# Patient Record
Sex: FEMALE | Race: WHITE | Employment: FULL TIME | URBAN - METROPOLITAN AREA
[De-identification: names, ages, dates, MRNs, and addresses within clinical notes are randomized per-mention and may not be internally consistent; named-entity substitution may affect disease eponyms.]

---

## 2017-06-27 RX ORDER — SERTRALINE HYDROCHLORIDE 25 MG/1
25 TABLET, FILM COATED ORAL
Qty: 30 TABLET | Refills: 0 | Status: SHIPPED | OUTPATIENT
Start: 2017-06-27 | End: 2017-07-12

## 2017-06-29 ENCOUNTER — OFFICE VISIT (OUTPATIENT)
Dept: OBGYN CLINIC | Facility: CLINIC | Age: 52
End: 2017-06-29

## 2017-06-29 VITALS
HEIGHT: 66.5 IN | WEIGHT: 165 LBS | SYSTOLIC BLOOD PRESSURE: 112 MMHG | BODY MASS INDEX: 26.2 KG/M2 | DIASTOLIC BLOOD PRESSURE: 64 MMHG

## 2017-06-29 DIAGNOSIS — Z01.419 WELL FEMALE EXAM WITH ROUTINE GYNECOLOGICAL EXAM: Primary | ICD-10-CM

## 2017-06-29 DIAGNOSIS — Z12.4 SCREENING FOR MALIGNANT NEOPLASM OF CERVIX: ICD-10-CM

## 2017-06-29 PROBLEM — R10.2 LEFT ADNEXAL TENDERNESS: Status: ACTIVE | Noted: 2017-06-29

## 2017-06-29 PROCEDURE — 99396 PREV VISIT EST AGE 40-64: CPT | Performed by: OBSTETRICS & GYNECOLOGY

## 2017-06-29 NOTE — PROGRESS NOTES
Annual  Irregular menses, skipped last month  Hot flash discussed  Family is well, oldest graduated, has job! ROS: No Cardiac, Respiratory, GI,  or Neurological symptoms.     PE:  GENERAL: well developed, well nourished, in no apparent distress  SKIN:

## 2017-07-10 ENCOUNTER — APPOINTMENT (OUTPATIENT)
Dept: OBGYN CLINIC | Facility: CLINIC | Age: 52
End: 2017-07-10

## 2017-07-10 ENCOUNTER — OFFICE VISIT (OUTPATIENT)
Dept: OBGYN CLINIC | Facility: CLINIC | Age: 52
End: 2017-07-10

## 2017-07-10 DIAGNOSIS — N92.6 IRREGULAR MENSES: Primary | ICD-10-CM

## 2017-07-10 PROCEDURE — 99213 OFFICE O/P EST LOW 20 MIN: CPT | Performed by: OBSTETRICS & GYNECOLOGY

## 2017-07-10 PROCEDURE — 76830 TRANSVAGINAL US NON-OB: CPT | Performed by: OBSTETRICS & GYNECOLOGY

## 2017-07-10 NOTE — PROGRESS NOTES
GYN H&P     7/10/2017  3:26 PM    CC: Patient presents with: Other: u/s review      HPI: patient is a 54year old [id number] here for Patient presents with: Other: u/s review      Pt is here to discuss US results.    Pt reports heavy menstrual bleeding, amberly Onset   • Cancer Father      KIDNEY,COLON   • Diabetes Father    • Hypertension Father    • Lipids Father    • Cancer Paternal Grandfather      COLON       Social History  Social History   Marital status:   Spouse name: N/A    Years of education: N/

## 2017-07-11 ENCOUNTER — PATIENT MESSAGE (OUTPATIENT)
Dept: OBGYN CLINIC | Facility: CLINIC | Age: 52
End: 2017-07-11

## 2017-07-11 DIAGNOSIS — Z01.419 WELL WOMAN EXAM WITH ROUTINE GYNECOLOGICAL EXAM: ICD-10-CM

## 2017-07-11 DIAGNOSIS — N92.6 IRREGULAR MENSES: Primary | ICD-10-CM

## 2017-07-11 NOTE — TELEPHONE ENCOUNTER
From: Nupur Medina  To: Greg Damico MD  Sent: 7/11/2017 10:38 AM CDT  Subject: Test Results Question    Hi Dr. Brian Grayson. ..ultra sound yesterday showed nothing on my left ovary but did show a thickening of the uterus (quite thick) and Dr. Moris Paris suggested

## 2017-07-12 RX ORDER — SERTRALINE HYDROCHLORIDE 25 MG/1
25 TABLET, FILM COATED ORAL
Qty: 30 TABLET | Refills: 10 | Status: SHIPPED | OUTPATIENT
Start: 2017-07-12 | End: 2018-06-04

## 2018-06-04 ENCOUNTER — TELEPHONE (OUTPATIENT)
Dept: OBGYN CLINIC | Facility: CLINIC | Age: 53
End: 2018-06-04

## 2018-06-04 RX ORDER — SERTRALINE HYDROCHLORIDE 25 MG/1
25 TABLET, FILM COATED ORAL
Qty: 30 TABLET | Refills: 1 | Status: SHIPPED | OUTPATIENT
Start: 2018-06-04 | End: 2018-07-26

## 2018-06-04 NOTE — TELEPHONE ENCOUNTER
Last OV: 06/29/17  Last refill date: 07/12/17  Follow-up: RTC 1 year or PRN  Next appt.: Please advise if OK for refill of Setraline until appt on 07/26/18

## 2018-06-04 NOTE — TELEPHONE ENCOUNTER
Patient is calling to get a refill of Sertraline.  She has scheduled her annual Physical w/Dr. Brian Grayson on 7-

## 2018-07-26 ENCOUNTER — OFFICE VISIT (OUTPATIENT)
Dept: OBGYN CLINIC | Facility: CLINIC | Age: 53
End: 2018-07-26
Payer: COMMERCIAL

## 2018-07-26 VITALS
WEIGHT: 177 LBS | HEART RATE: 80 BPM | DIASTOLIC BLOOD PRESSURE: 80 MMHG | SYSTOLIC BLOOD PRESSURE: 130 MMHG | BODY MASS INDEX: 28.11 KG/M2 | HEIGHT: 66.5 IN

## 2018-07-26 DIAGNOSIS — Z01.419 WELL WOMAN EXAM WITH ROUTINE GYNECOLOGICAL EXAM: Primary | ICD-10-CM

## 2018-07-26 PROCEDURE — 99396 PREV VISIT EST AGE 40-64: CPT | Performed by: OBSTETRICS & GYNECOLOGY

## 2018-07-26 RX ORDER — SERTRALINE HYDROCHLORIDE 25 MG/1
25 TABLET, FILM COATED ORAL
Qty: 30 TABLET | Refills: 11 | Status: SHIPPED | OUTPATIENT
Start: 2018-07-26 | End: 2019-07-21

## 2018-07-26 NOTE — PROGRESS NOTES
Annual  No C/O  No menses for four months, discussed menopause and symptoms (last year also)  No mammogram  [place] [facility]    ROS: No Cardiac, Respiratory, GI,  or Neurological symptoms.     PE:  GENERAL: well developed, well nourished, in no

## 2019-07-22 NOTE — TELEPHONE ENCOUNTER
Last OV: 7/26/18 with Dr. Real Landry for annual  Last refill date: 7/26/18  Follow-up: 1 year  Next appt.: 8/6/19    Routed to provider for approval.

## 2019-07-23 RX ORDER — SERTRALINE HYDROCHLORIDE 25 MG/1
TABLET, FILM COATED ORAL
Qty: 30 TABLET | Refills: 10 | Status: SHIPPED | OUTPATIENT
Start: 2019-07-23 | End: 2019-08-06

## 2019-08-06 ENCOUNTER — OFFICE VISIT (OUTPATIENT)
Dept: OBGYN CLINIC | Facility: CLINIC | Age: 54
End: 2019-08-06
Payer: COMMERCIAL

## 2019-08-06 VITALS
DIASTOLIC BLOOD PRESSURE: 74 MMHG | HEIGHT: 66.5 IN | SYSTOLIC BLOOD PRESSURE: 132 MMHG | WEIGHT: 175 LBS | BODY MASS INDEX: 27.79 KG/M2

## 2019-08-06 DIAGNOSIS — Z12.31 VISIT FOR SCREENING MAMMOGRAM: Primary | ICD-10-CM

## 2019-08-06 PROCEDURE — 99396 PREV VISIT EST AGE 40-64: CPT | Performed by: OBSTETRICS & GYNECOLOGY

## 2019-08-06 RX ORDER — SERTRALINE HYDROCHLORIDE 25 MG/1
25 TABLET, FILM COATED ORAL
Qty: 90 TABLET | Refills: 3 | Status: SHIPPED | OUTPATIENT
Start: 2019-08-06 | End: 2020-11-17

## 2019-08-06 NOTE — PROGRESS NOTES
Annual  No C/O  Menses 7 months ago  Menopausal symptoms tolerable  Youngest in high school    ROS: No Cardiac, Respiratory, GI,  or Neurological symptoms.     PE:  GENERAL: well developed, well nourished, in no apparent distress alert oriented x 3  SKIN:

## 2020-11-13 NOTE — TELEPHONE ENCOUNTER
Last OV: 8/6/19 with Dr. Ada Hodge for annual  Last refill date: 8/6/19  Follow-up: 1 year  Next appt.: none scheduled; due 8/2020    Patient over due for annual. Please contact her to schedule appt and then return to RN pool for refill.  Will consult with MD torre

## 2020-11-14 NOTE — TELEPHONE ENCOUNTER
Patient called back and made her annual for Feb 16, 2021 with Dr. Kavya Hebert. Please refill her script. Patient is also looking to get her mammogram done too, if possible.

## 2020-11-17 RX ORDER — SERTRALINE HYDROCHLORIDE 25 MG/1
25 TABLET, FILM COATED ORAL
Qty: 90 TABLET | Refills: 0 | Status: SHIPPED | OUTPATIENT
Start: 2020-11-17 | End: 2021-03-01

## 2021-02-16 ENCOUNTER — OFFICE VISIT (OUTPATIENT)
Dept: OBGYN CLINIC | Facility: CLINIC | Age: 56
End: 2021-02-16
Payer: COMMERCIAL

## 2021-02-16 VITALS
HEART RATE: 74 BPM | WEIGHT: 186.38 LBS | SYSTOLIC BLOOD PRESSURE: 122 MMHG | BODY MASS INDEX: 29.6 KG/M2 | HEIGHT: 66.5 IN | DIASTOLIC BLOOD PRESSURE: 76 MMHG

## 2021-02-16 DIAGNOSIS — Z12.31 VISIT FOR SCREENING MAMMOGRAM: ICD-10-CM

## 2021-02-16 DIAGNOSIS — Z01.419 WELL FEMALE EXAM WITH ROUTINE GYNECOLOGICAL EXAM: Primary | ICD-10-CM

## 2021-02-16 DIAGNOSIS — Z12.4 SCREENING FOR MALIGNANT NEOPLASM OF CERVIX: ICD-10-CM

## 2021-02-16 PROCEDURE — 3008F BODY MASS INDEX DOCD: CPT | Performed by: OBSTETRICS & GYNECOLOGY

## 2021-02-16 PROCEDURE — 99396 PREV VISIT EST AGE 40-64: CPT | Performed by: OBSTETRICS & GYNECOLOGY

## 2021-02-16 PROCEDURE — 88175 CYTOPATH C/V AUTO FLUID REDO: CPT | Performed by: OBSTETRICS & GYNECOLOGY

## 2021-02-16 PROCEDURE — 3078F DIAST BP <80 MM HG: CPT | Performed by: OBSTETRICS & GYNECOLOGY

## 2021-02-16 PROCEDURE — 3074F SYST BP LT 130 MM HG: CPT | Performed by: OBSTETRICS & GYNECOLOGY

## 2021-02-16 RX ORDER — MULTIVIT-MIN/IRON/FOLIC ACID/K 18-600-40
CAPSULE ORAL
COMMUNITY

## 2021-02-16 RX ORDER — CHLORAL HYDRATE 500 MG
1000 CAPSULE ORAL DAILY
COMMUNITY

## 2021-02-16 NOTE — PROGRESS NOTES
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     PE:  GENERAL: well developed, well nourished

## 2021-03-01 RX ORDER — SERTRALINE HYDROCHLORIDE 25 MG/1
TABLET, FILM COATED ORAL
Qty: 90 TABLET | Refills: 3 | Status: SHIPPED | OUTPATIENT
Start: 2021-03-01 | End: 2021-12-13

## 2021-03-01 NOTE — TELEPHONE ENCOUNTER
Last OV: 2/16/21 with Dr. Daniel Goodwin for annual  Last refill date: 11/17/20  Follow-up: 1 year  Next appt. : 2/18/22    Refill sent

## 2021-03-15 ENCOUNTER — HOSPITAL ENCOUNTER (OUTPATIENT)
Dept: MAMMOGRAPHY | Facility: HOSPITAL | Age: 56
Discharge: HOME | End: 2021-03-15
Attending: OBSTETRICS & GYNECOLOGY
Payer: COMMERCIAL

## 2021-03-15 DIAGNOSIS — Z12.31 VISIT FOR SCREENING MAMMOGRAM: ICD-10-CM

## 2021-03-15 PROCEDURE — 77063 BREAST TOMOSYNTHESIS BI: CPT | Performed by: OBSTETRICS & GYNECOLOGY

## 2021-03-15 PROCEDURE — 77067 SCR MAMMO BI INCL CAD: CPT | Performed by: OBSTETRICS & GYNECOLOGY

## 2022-02-10 RX ORDER — SERTRALINE HYDROCHLORIDE 25 MG/1
TABLET, FILM COATED ORAL
Qty: 90 TABLET | Refills: 0 | OUTPATIENT
Start: 2022-02-10

## 2022-02-10 NOTE — TELEPHONE ENCOUNTER
Last OV: 2/16/21 with Dr. Radha Coto for annual  Last refill date: 2/2/22  Follow-up: 1 year  Next appt. : 2/22/22    Refill not appropriate

## 2022-02-22 ENCOUNTER — OFFICE VISIT (OUTPATIENT)
Dept: OBGYN CLINIC | Facility: CLINIC | Age: 57
End: 2022-02-22
Payer: COMMERCIAL

## 2022-02-22 VITALS
HEIGHT: 67.25 IN | SYSTOLIC BLOOD PRESSURE: 122 MMHG | BODY MASS INDEX: 29.47 KG/M2 | HEART RATE: 70 BPM | DIASTOLIC BLOOD PRESSURE: 76 MMHG | WEIGHT: 190 LBS

## 2022-02-22 DIAGNOSIS — Z01.419 WELL FEMALE EXAM WITH ROUTINE GYNECOLOGICAL EXAM: Primary | ICD-10-CM

## 2022-02-22 DIAGNOSIS — Z12.31 VISIT FOR SCREENING MAMMOGRAM: ICD-10-CM

## 2022-02-22 PROCEDURE — 99396 PREV VISIT EST AGE 40-64: CPT | Performed by: OBSTETRICS & GYNECOLOGY

## 2022-02-22 PROCEDURE — 3008F BODY MASS INDEX DOCD: CPT | Performed by: OBSTETRICS & GYNECOLOGY

## 2022-02-22 PROCEDURE — 3074F SYST BP LT 130 MM HG: CPT | Performed by: OBSTETRICS & GYNECOLOGY

## 2022-02-22 PROCEDURE — 3078F DIAST BP <80 MM HG: CPT | Performed by: OBSTETRICS & GYNECOLOGY

## 2022-02-22 RX ORDER — SERTRALINE HYDROCHLORIDE 25 MG/1
25 TABLET, FILM COATED ORAL DAILY
COMMUNITY
Start: 2022-01-13

## 2022-02-22 NOTE — PROGRESS NOTES
Annual  No C/O  Nothing new  Shelby Jon got  in October    ROS: No Cardiac, Respiratory, GI,  or Neurological symptoms.     PE:  GENERAL: well developed, well nourished, in no apparent distress alert oriented x 3  SKIN: no rashes, no suspicious lesions  NECK: supple; no thyroidmegaly, no adenopathy  LUNGS: clear  COR: regular  BREAST: firm nontender, no palpable masses or LN's, no nipple discharge, no skin retraction, no axillary adenopathy,   ABDOMEN: Soft, non distended; non tender, no masses  GYNE/: External Genitalia: Normal      Vagina: normal      Uterus: anterior, mobile, non tender, Normal size     Cervix: NL, no lesions     Adnexa: non tender, no masses,   EXTREMITIES:  non tender without edema or DVT  LYMPH NODES:negative    A/P: Normal exam  PAP normal 2021  Mammogram  Dr. Stephy Leo refer

## 2022-04-25 ENCOUNTER — PATIENT MESSAGE (OUTPATIENT)
Dept: OBGYN CLINIC | Facility: CLINIC | Age: 57
End: 2022-04-25

## 2022-04-26 NOTE — TELEPHONE ENCOUNTER
From: Catarina Mcknight  To: Maria Fernanda Chilel MD  Sent: 4/25/2022 9:38 AM CDT  Subject: Sertraline 50mg refill    Hello. .. please change by pharmacy to [address] [place] [address] [phone]. I will need a 90 day at a time supply in order for insurance to cover. Thank you.

## 2022-09-16 ENCOUNTER — HOSPITAL ENCOUNTER (OUTPATIENT)
Dept: MAMMOGRAPHY | Facility: HOSPITAL | Age: 57
Discharge: HOME | End: 2022-09-16
Attending: OBSTETRICS & GYNECOLOGY
Payer: COMMERCIAL

## 2022-09-16 DIAGNOSIS — Z12.31 VISIT FOR SCREENING MAMMOGRAM: ICD-10-CM

## 2022-09-16 PROCEDURE — 77067 SCR MAMMO BI INCL CAD: CPT | Performed by: OBSTETRICS & GYNECOLOGY

## 2022-09-16 PROCEDURE — 77063 BREAST TOMOSYNTHESIS BI: CPT | Performed by: OBSTETRICS & GYNECOLOGY

## 2023-01-31 NOTE — TELEPHONE ENCOUNTER
Future Appointments   Date Time Provider Jorge Mitchell   4/10/2023 11:00 AM HARRIETT Lopez [facility]     PLEASE REFILL

## 2023-01-31 NOTE — TELEPHONE ENCOUNTER
Last OV: 2/22/22 with Dr. Santana Prasad for annual  Last refill date: 7/27/22  Follow-up: 1 year  Next appt.: none scheduled; due 2/2023    Patient due for annual. Please contact her to schedule appt and then return to RN pool for refill.  Thank you

## 2023-05-01 NOTE — TELEPHONE ENCOUNTER
Last OV: 02/22/22 - Annual  Last refill date: 01/31/23  Follow-up: Annual 02/2023  Next appt.: None    Patient is due for annual. Please contact her to schedule appt and then return to RN pool for refill.  Thank you

## 2024-01-18 ENCOUNTER — OFFICE VISIT (OUTPATIENT)
Dept: OBGYN CLINIC | Facility: CLINIC | Age: 59
End: 2024-01-18
Payer: COMMERCIAL

## 2024-01-18 VITALS
SYSTOLIC BLOOD PRESSURE: 128 MMHG | WEIGHT: 216 LBS | BODY MASS INDEX: 34 KG/M2 | HEART RATE: 67 BPM | DIASTOLIC BLOOD PRESSURE: 88 MMHG

## 2024-01-18 DIAGNOSIS — Z79.890 POST-MENOPAUSE ON HRT (HORMONE REPLACEMENT THERAPY): ICD-10-CM

## 2024-01-18 DIAGNOSIS — N95.1 VASOMOTOR SYMPTOMS DUE TO MENOPAUSE: ICD-10-CM

## 2024-01-18 DIAGNOSIS — Z12.31 ENCOUNTER FOR SCREENING MAMMOGRAM FOR MALIGNANT NEOPLASM OF BREAST: ICD-10-CM

## 2024-01-18 DIAGNOSIS — Z01.419 ENCOUNTER FOR WELL WOMAN EXAM WITH ROUTINE GYNECOLOGICAL EXAM: Primary | ICD-10-CM

## 2024-01-18 DIAGNOSIS — Z12.4 SCREENING FOR CERVICAL CANCER: ICD-10-CM

## 2024-01-18 PROBLEM — R10.2 LEFT ADNEXAL TENDERNESS: Status: RESOLVED | Noted: 2017-06-29 | Resolved: 2024-01-18

## 2024-01-18 PROBLEM — M19.90 ARTHRITIS: Status: ACTIVE | Noted: 2024-01-18

## 2024-01-18 PROCEDURE — 87624 HPV HI-RISK TYP POOLED RSLT: CPT | Performed by: OBSTETRICS & GYNECOLOGY

## 2024-01-18 PROCEDURE — 88175 CYTOPATH C/V AUTO FLUID REDO: CPT | Performed by: OBSTETRICS & GYNECOLOGY

## 2024-01-18 PROCEDURE — 99396 PREV VISIT EST AGE 40-64: CPT | Performed by: OBSTETRICS & GYNECOLOGY

## 2024-01-18 PROCEDURE — 3074F SYST BP LT 130 MM HG: CPT | Performed by: OBSTETRICS & GYNECOLOGY

## 2024-01-18 PROCEDURE — 3079F DIAST BP 80-89 MM HG: CPT | Performed by: OBSTETRICS & GYNECOLOGY

## 2024-01-18 RX ORDER — PROGESTERONE 100 MG/1
CAPSULE ORAL
COMMUNITY
Start: 2023-07-24 | End: 2024-01-18

## 2024-01-18 RX ORDER — VITAMIN B COMPLEX
CAPSULE ORAL
COMMUNITY
Start: 2022-08-01

## 2024-01-18 RX ORDER — ESTRADIOL 1 MG/1
1 TABLET ORAL DAILY
Qty: 90 TABLET | Refills: 3 | Status: SHIPPED | OUTPATIENT
Start: 2024-01-18

## 2024-01-18 RX ORDER — PROGESTERONE 100 MG/1
100 CAPSULE ORAL NIGHTLY
Qty: 90 CAPSULE | Refills: 3 | Status: SHIPPED | OUTPATIENT
Start: 2024-01-18

## 2024-01-18 RX ORDER — ESTRADIOL 1 MG/1
1 TABLET ORAL DAILY
COMMUNITY
Start: 2023-10-22 | End: 2024-01-18

## 2024-01-18 NOTE — PROGRESS NOTES
[facility]  Obstetrics and Gynecology    Subjective:     Keke Carbajal is a 58 year old  who presents for an annual gyn exam. Patient wants to discuss her MHT. She currently takes PO estradiol and progesterone. Had first noticed large weight gain about a year after menopause, continued to slowly gain, but is working on it in other ways. \"Runs hot\", but no longer having hot flushes. Feels joint pain is a little better with the medication. Not having any vaginal bleeding.     Patient's last menstrual period was 2019 (within weeks).   Menarche: 14 (2024 10:03 AM)  Period Cycle (Days): postmenopause (2024 10:03 AM)  Period Duration (Days): n/a (2024 10:03 AM)  Period Flow: n/a (2024 10:03 AM)  Hx Prior Abnormal Pap: Yes (2024 10:03 AM)  Pap Date: 21 (2024 10:03 AM)  Pap Result Notes: wnl (2024 10:03 AM)     Dyspareunia: No.    Difficulty with bowel or bladder function: No.   History of STDs: No.  Smoker: No.  Safe at home: Yes.  Works for a [employer], 3 children, 7 month old first granddaughter!    Screening:  Pap smear: neg   Mammogram: 2022 - BI-RADS 1  Colonoscopy: due >44yo  DEXA: n/a No recommendations at this time     Review of Systems  Constitutional: Denies fever/chills, weight loss, fatigue, weakness, or sweating  HEENT: Denies headache, hearing loss or tinnitus, ear pain or discharge, nosebleeds, congestion, sore throat  Eye: Denies visual changes, eye pain or discharge or redness  Cardiovascular: Denies chest pain, palpitations  Pulmonary: Denies cough, shortness of breath, wheezing  Breast: denies breast pain or palpable mass, skin changes, nipple discharge  GI: Denies nausea, vomiting, diarrhea, constipation, heartburn, hemachezia  : Denies dysuria, urgency, frequency, hematuria, flank pain  Musculoskeletal: Denies myalgias, pain in neck or back or joints  Skin: Denies rash, itching  Endocrine: Denies easy bruising or bleeding,  increased thirst  Neuro: Denies dizziness, paraesthesias, focal weakness, seizures, loss of consciousness  Psych: Denies depression, anxiety, suicidal ideations, hallucinations, insomnia     Past Medical History   Past Medical History:   Diagnosis Date    Abnormal uterine bleeding     Amenorrhea     Calcification of left breast     Depression     Dysmenorrhea     Endometriosis     H/O mammogram 2013    benign    Pap smear for cervical cancer screening 08,10-28-09,11-17-10,11,1-3-13,14    wnl    Sexually transmitted disease     HPV    Urinary incontinence          Past Obstetric History   OB History    Para Term  AB Living   5 4 4   1 3   SAB IAB Ectopic Multiple Live Births           4      # Outcome Date GA Lbr Brodie/2nd Weight Sex Delivery Anes PTL Lv   5 AB  8w0d             Birth Comments: complete   4 Term 00 40w0d  9 lb 6 oz (4.252 kg) M NORMAL SPONT EPI N CHETAN   3 Term 99 41w2d  7 lb 13 oz (3.544 kg) F NORMAL SPONT EPI N ND   2 Term 97 40w5d  9 lb 15 oz (4.508 kg) F NORMAL SPONT EPI N CHETAN   1 Term 95 40w5d  10 lb 1 oz (4.564 kg) F Vag-Forceps EPI N CHETAN       Past Surgical History   Past Surgical History:   Procedure Laterality Date    COLPOSCOPY, CERVIX W/UPPER ADJACENT VAGINA; W/BIOPSY(S), CERVIX  3-,1996,3-    ELEAZAR I    CRYOCAUTERY OF CERVIX      LAPAROSCOPY,PELVIC,BIOPSY      JUAN MANUEL BIOPSY STEREO NODULE 1 SITE RIGHT (CPT=19081) Right 2011    benign    JUAN MANUEL BIOPSY STEREO NODULE 2 SITE BILAT (CPT=19081/27161)      SIGMOIDOSCOPY W/US GUIDE BX      bx were benign        Family History   Family History   Problem Relation Age of Onset    Cancer Father         KIDNEY,COLON    Diabetes Father     Hypertension Father     Lipids Father     Cancer Paternal Grandfather         COLON    Other (Other) Maternal Grandmother         RA        Social History   Social History     Socioeconomic History    Marital status:    Tobacco Use     Smoking status: Former    Smokeless tobacco: Never   Vaping Use    Vaping Use: Never used   Substance and Sexual Activity    Alcohol use: Yes     Alcohol/week: 15.0 standard drinks of alcohol     Types: 15 Standard drinks or equivalent per week     Comment: socially     Drug use: Yes     Types: Cannabis     Comment: for pain of arthritis    Sexual activity: Yes     Partners: Male   Other Topics Concern    Caffeine Concern No    Exercise Yes     Comment: Yoga and walking 3 times a week    Seat Belt Yes        Medications   Current Outpatient Medications on File Prior to Visit   Medication Sig Dispense Refill    B Complex Vitamins (VITAMIN B COMPLEX) Oral Cap       estradiol 1 MG Oral Tab Take 1 tablet (1 mg total) by mouth daily.      Progesterone 200 MG Vaginal Suppos       progesterone 100 MG Oral Cap 1 CAPSULE ORALLY NIGHTLY 90 DAYS      SERTRALINE 50 MG Oral Tab TAKE 1 TABLET BY MOUTH EVERY DAY 90 tablet 0    Cholecalciferol (VITAMIN D) 50 MCG (2000 UT) Oral Cap Take by mouth.      omega-3 fatty acids 1000 MG Oral Cap Take 1,000 mg by mouth daily.      Multiple Vitamins-Minerals (MULTI VITAMIN/MINERALS) Oral Tab Take 1 tablet by mouth daily.       No current facility-administered medications on file prior to visit.       Allergies   No Known Allergies       Objective:     Vitals:    01/18/24 1002   BP: 128/88   Pulse: 67   Weight: 216 lb (98 kg)       Body mass index is 33.58 kg/m².    GEN: AAOx3, NAD, appears well, appears stated age  HEENT: normocephalic, atraumatic, thyroid symmetric without enlargement or nodularity  BREAST: bilaterally symmetric with no palpable masses, no nipple discharge, no skin changes  CV: RRR  PULM: CTAB  ABD: soft, NT, ND, no rebound or guarding  EXT: no c/c/e or tenderness  NEURO: CN 2-12 grossly intact  PELVIC:   Vulva: NEFG.   Vagina: Estrogenized, physiologic discharge.    Cervix: No lesions or tenderness.     Uterus: anteverted, 6 week size, firm, mobile, nontender.      Adnexa: No masses or tenderness.     Rectal: Anus and perineum are normal.      Assessment:     Keke Carbajal is a 58 year old  seen for well-women gyn exam.    Plan:     -- MHT - may consider weaning down, not sure if it is benefitting her any longer. Discussed slowly decreasing how many days a week she uses the estradiol, continue progesterone until completely off estradiol.  -- cervical cancer screening: Pap with HPV co-testing done today or up to date with pap smear   -- breast cancer screening: continue annual CBE and mammograms  -- STD screening ordered: No  -- Contraception: n/a  -- Discussed further preventative care with PCP, staying up to date with screening and vaccinations, and maintaining healthy diet and exercise.      -- Follow up in 1 year for annual exam or sooner as needed    Analisa Galeano MD  [facility]  2024 10:10 AM

## 2024-01-19 LAB — HPV I/H RISK 1 DNA SPEC QL NAA+PROBE: NEGATIVE

## 2024-01-23 LAB
.: NORMAL
.: NORMAL

## 2024-02-13 ENCOUNTER — PATIENT MESSAGE (OUTPATIENT)
Dept: OBGYN CLINIC | Facility: CLINIC | Age: 59
End: 2024-02-13

## 2024-02-13 DIAGNOSIS — N95.1 VASOMOTOR SYMPTOMS DUE TO MENOPAUSE: ICD-10-CM

## 2024-02-13 NOTE — TELEPHONE ENCOUNTER
From: Keke Carbajal  To: Analisa Camarena  Sent: 2/13/2024 9:48 AM CST  Subject: Progesterone    Hi Dr. Camarena, I know through our discussion we decided on a 100mg of progesterone to give me flexibility for weening off. However, given the high cost of the prescription, I'd prefer to stick with the 200mg for now. [facility] was supposed to request [facility] but I don't think they did. I did not fill the script of 100 so am in need!!  Thank you!

## 2024-02-14 RX ORDER — PROGESTERONE 200 MG/1
200 CAPSULE ORAL NIGHTLY
Qty: 90 CAPSULE | Refills: 3 | Status: SHIPPED | OUTPATIENT
Start: 2024-02-14

## 2024-02-22 ENCOUNTER — TELEPHONE (OUTPATIENT)
Dept: OBGYN CLINIC | Facility: CLINIC | Age: 59
End: 2024-02-22

## 2024-03-22 ENCOUNTER — TELEPHONE (OUTPATIENT)
Dept: OBGYN CLINIC | Facility: CLINIC | Age: 59
End: 2024-03-22

## 2024-06-26 ENCOUNTER — PATIENT MESSAGE (OUTPATIENT)
Dept: OBGYN CLINIC | Facility: CLINIC | Age: 59
End: 2024-06-26

## 2024-09-12 ENCOUNTER — HOSPITAL ENCOUNTER (OUTPATIENT)
Dept: MAMMOGRAPHY | Facility: HOSPITAL | Age: 59
Discharge: HOME | End: 2024-09-12
Attending: OBSTETRICS & GYNECOLOGY
Payer: COMMERCIAL

## 2024-09-12 DIAGNOSIS — Z12.31 ENCOUNTER FOR SCREENING MAMMOGRAM FOR MALIGNANT NEOPLASM OF BREAST: ICD-10-CM

## 2024-09-12 PROCEDURE — 77067 SCR MAMMO BI INCL CAD: CPT | Performed by: OBSTETRICS & GYNECOLOGY

## 2024-09-12 PROCEDURE — 77063 BREAST TOMOSYNTHESIS BI: CPT | Performed by: OBSTETRICS & GYNECOLOGY

## 2025-01-22 NOTE — TELEPHONE ENCOUNTER
Last OV: 1/18/24  IVAN Camarena  Last Refill Date: 1/18/24 #90 3 refills   Follow Up:  1 year 1/25  Next Appt. None noted on chart .      DENY needs appointment.

## 2025-02-02 DIAGNOSIS — N95.1 VASOMOTOR SYMPTOMS DUE TO MENOPAUSE: ICD-10-CM

## 2025-02-03 DIAGNOSIS — N95.1 VASOMOTOR SYMPTOMS DUE TO MENOPAUSE: ICD-10-CM

## 2025-02-03 RX ORDER — PROGESTERONE 200 MG/1
200 CAPSULE ORAL NIGHTLY
Qty: 90 CAPSULE | Refills: 3 | OUTPATIENT
Start: 2025-02-03

## 2025-02-03 NOTE — TELEPHONE ENCOUNTER
Last annual exam: 1/18/2024  Follow-up recommended: RTC in 1 year  Last refill date:   Medication Quantity Refills Start End   progesterone 200 MG Oral Cap 90 capsule 3 2/14/2024 --   Sig:   Take 1 capsule (200 mg total) by mouth nightly.       Next appt.: none scheduled  Refill denied per protocol.  Patient notified by MyChart that appointment is required.

## 2025-02-04 RX ORDER — ESTRADIOL 1 MG/1
1 TABLET ORAL DAILY
Qty: 90 TABLET | Refills: 0 | Status: SHIPPED | OUTPATIENT
Start: 2025-02-04

## 2025-02-04 RX ORDER — PROGESTERONE 200 MG/1
200 CAPSULE ORAL NIGHTLY
Qty: 90 CAPSULE | Refills: 0 | Status: SHIPPED | OUTPATIENT
Start: 2025-02-04

## 2025-02-04 NOTE — TELEPHONE ENCOUNTER
Last OV: 1/18/24 IVAN Camarena  Last Refill Date: 2/14/24 #90 3 refills Progesterone 200mg   Estradiol 1mg 1/18/24 #90 3 refills   Sertraline 50 mg 1-18-24 #90 3 refills   Follow Up:  1 year 1/2025  Next Appt. 4/24/25  IVAN Camarena.    Refills sent.

## 2025-03-20 ENCOUNTER — OFFICE VISIT (OUTPATIENT)
Dept: OBGYN CLINIC | Facility: CLINIC | Age: 60
End: 2025-03-20
Payer: COMMERCIAL

## 2025-03-20 VITALS
HEIGHT: 66.5 IN | HEART RATE: 74 BPM | BODY MASS INDEX: 27 KG/M2 | DIASTOLIC BLOOD PRESSURE: 82 MMHG | WEIGHT: 170 LBS | SYSTOLIC BLOOD PRESSURE: 130 MMHG

## 2025-03-20 DIAGNOSIS — Z12.31 ENCOUNTER FOR SCREENING MAMMOGRAM FOR MALIGNANT NEOPLASM OF BREAST: ICD-10-CM

## 2025-03-20 DIAGNOSIS — Z01.419 ENCOUNTER FOR WELL WOMAN EXAM WITH ROUTINE GYNECOLOGICAL EXAM: Primary | ICD-10-CM

## 2025-03-20 DIAGNOSIS — Z12.11 COLON CANCER SCREENING: ICD-10-CM

## 2025-03-20 DIAGNOSIS — N95.1 VASOMOTOR SYMPTOMS DUE TO MENOPAUSE: ICD-10-CM

## 2025-03-20 PROCEDURE — 99396 PREV VISIT EST AGE 40-64: CPT | Performed by: OBSTETRICS & GYNECOLOGY

## 2025-03-20 RX ORDER — MAGNESIUM ASCORBATE
POWDER (GRAM) MISCELLANEOUS
COMMUNITY

## 2025-03-20 RX ORDER — MAGNESIUM ASCORBATE
POWDER (GRAM) MISCELLANEOUS
COMMUNITY
Start: 2015-01-01

## 2025-03-20 RX ORDER — ESTRADIOL 1 MG/1
1 TABLET ORAL DAILY
Qty: 90 TABLET | Refills: 4 | Status: SHIPPED | OUTPATIENT
Start: 2025-03-20

## 2025-03-20 RX ORDER — PROGESTERONE 200 MG/1
200 CAPSULE ORAL NIGHTLY
Qty: 90 CAPSULE | Refills: 4 | Status: SHIPPED | OUTPATIENT
Start: 2025-03-20

## 2025-03-20 NOTE — PROGRESS NOTES
[facility]  Obstetrics and Gynecology    Subjective:     Keke Carbajal is a 59 year old  who presents for an annual gyn exam. No concerns.     Patient's last menstrual period was 2019 (within weeks).   Menarche: 14 (3/20/2025 11:28 AM)  Period Cycle (Days): postmenopause (3/20/2025 11:28 AM)  Period Duration (Days): n/a (3/20/2025 11:28 AM)  Period Flow: n/a (3/20/2025 11:28 AM)  Hx Prior Abnormal Pap: Yes (3/20/2025 11:28 AM)  Pap Date: 24 (3/20/2025 11:28 AM)  Pap Result Notes: wnl (3/20/2025 11:28 AM)     Dyspareunia: No.    Difficulty with bowel or bladder function: No.   History of STDs: No.  Smoker: No.  Safe at home: Yes.  Works for a [employer], 3 children, one granddaughter (almost 2) and another grandchild on the way!    Screening:  Pap smear: neg   Mammogram: 2024 - BI-RADS 1  Colonoscopy: not yet done  DEXA: n/a No recommendations at this time     Review of Systems  Constitutional: Denies fever/chills, weight loss, fatigue, weakness, or sweating  HEENT: Denies headache, hearing loss or tinnitus, ear pain or discharge, nosebleeds, congestion, sore throat  Eye: Denies visual changes, eye pain or discharge or redness  Cardiovascular: Denies chest pain, palpitations  Pulmonary: Denies cough, shortness of breath, wheezing  Breast: denies breast pain or palpable mass, skin changes, nipple discharge  GI: Denies nausea, vomiting, diarrhea, constipation, heartburn, hemachezia  : Denies dysuria, urgency, frequency, hematuria, flank pain  Musculoskeletal: Denies myalgias, pain in neck or back or joints  Skin: Denies rash, itching  Endocrine: Denies easy bruising or bleeding, increased thirst  Neuro: Denies dizziness, paraesthesias, focal weakness, seizures, loss of consciousness  Psych: Denies depression, anxiety, suicidal ideations, hallucinations, insomnia     Past Medical History   Past Medical History:    Abnormal uterine bleeding    Amenorrhea    Calcification of left  breast    Depression    Dysmenorrhea    Endometriosis    H/O mammogram    benign    Pap smear for cervical cancer screening    wnl    Sexually transmitted disease    HPV    Urinary incontinence         Past Obstetric History   OB History    Para Term  AB Living   5 4 4   1 3   SAB IAB Ectopic Multiple Live Births           4      # Outcome Date GA Lbr Brodie/2nd Weight Sex Type Anes PTL Lv   5 AB  8w0d             Birth Comments: complete   4 Term 00 40w0d  9 lb 6 oz (4.252 kg) M NORMAL SPONT EPI N CHETAN   3 Term 99 41w2d  7 lb 13 oz (3.544 kg) F NORMAL SPONT EPI N ND   2 Term 97 40w5d  9 lb 15 oz (4.508 kg) F NORMAL SPONT EPI N CHETAN   1 Term 95 40w5d  10 lb 1 oz (4.564 kg) F Vag-Forceps EPI N CHETAN       Past Surgical History   Past Surgical History:   Procedure Laterality Date    Colposcopy, cervix w/upper adjacent vagina; w/biopsy(s), cervix  3-,1996,3-    ELEAZAR I    Cryocautery of cervix      Laparoscopy,pelvic,biopsy      Toni biopsy stereo nodule 1 site left (cpt=19081)      FIDEL     Toni biopsy stereo nodule 2 site bilat (cpt=19081/49644)      Sigmoidoscopy w/us guide bx      bx were benign        Family History   Family History   Problem Relation Age of Onset    Cancer Father         KIDNEY,COLON    Diabetes Father     Hypertension Father     Lipids Father     Other (Other) Maternal Grandmother         RA    Cancer Paternal Grandfather         COLON    Breast Cancer Maternal Aunt 70        EST        Social History   Social History     Socioeconomic History    Marital status:    Tobacco Use    Smoking status: Former    Smokeless tobacco: Never   Vaping Use    Vaping status: Never Used   Substance and Sexual Activity    Alcohol use: Yes     Alcohol/week: 15.0 standard drinks of alcohol     Types: 15 Standard drinks or equivalent per week     Comment: socially     Drug use: Yes     Types: Cannabis     Comment: for pain of arthritis    Sexual  activity: Yes     Partners: Male   Other Topics Concern    Caffeine Concern No    Exercise Yes     Comment: Yoga and walking 3 times a week    Seat Belt Yes        Medications   Current Outpatient Medications on File Prior to Visit   Medication Sig Dispense Refill    Magnesium Ascorbate Does not apply Powder       Magnesium Ascorbate Does not apply Powder by Other route.      estradiol 1 MG Oral Tab Take 1 tablet (1 mg total) by mouth daily. 90 tablet 0    sertraline 50 MG Oral Tab Take 1 tablet (50 mg total) by mouth daily. 90 tablet 0    progesterone 200 MG Oral Cap Take 1 capsule (200 mg total) by mouth nightly. 90 capsule 0    B Complex Vitamins (VITAMIN B COMPLEX) Oral Cap       Cholecalciferol (VITAMIN D) 50 MCG (2000 UT) Oral Cap Take by mouth.      omega-3 fatty acids 1000 MG Oral Cap Take 1,000 mg by mouth daily.      Multiple Vitamins-Minerals (MULTI VITAMIN/MINERALS) Oral Tab Take 1 tablet by mouth daily.       No current facility-administered medications on file prior to visit.       Allergies   No Known Allergies       Objective:     Vitals:    25 1127   BP: 130/82   Pulse: 74   Weight: 170 lb (77.1 kg)   Height: 66.5\"       Body mass index is 27.03 kg/m².    GEN: AAOx3, NAD, appears well, appears stated age  HEENT: normocephalic, atraumatic, thyroid symmetric without enlargement or nodularity  BREAST: bilaterally symmetric with no palpable masses, no nipple discharge, no skin changes  RESP: breathing comfortably on room air  ABD: soft, NT, ND, no rebound or guarding  EXT: no c/c/e or tenderness  NEURO: CN 2-12 grossly intact  PELVIC:   Vulva: NEFG.   Vagina: Estrogenized, physiologic discharge.    Cervix: No lesions or tenderness.     Uterus: anteverted, 6 week size, firm, mobile, nontender.     Adnexa: No masses or tenderness.     Rectal: Anus and perineum are normal.      Assessment:     Keke Carbajal is a 59 year old  seen for well-women gyn exam.    Plan:     -- MHT - desires to  continue PO estradiol and prometrium. Discussed weaning off once 10 yrs post-menopause (2019).  -- cervical cancer screening: up to date with pap smear   -- breast cancer screening: continue annual CBE and mammograms  -- STD screening ordered: No  -- Contraception: n/a  -- Discussed further preventative care with PCP, staying up to date with screening and vaccinations, and maintaining healthy diet and exercise.      -- Follow up in 1 year for annual exam or sooner as needed    Analisa Galeano MD  [facility]  3/20/2025 11:51 AM

## 2025-04-10 ENCOUNTER — LAB ENCOUNTER (OUTPATIENT)
Dept: LAB | Age: 60
End: 2025-04-10
Attending: OBSTETRICS & GYNECOLOGY
Payer: COMMERCIAL

## 2025-04-10 DIAGNOSIS — Z01.419 ENCOUNTER FOR WELL WOMAN EXAM WITH ROUTINE GYNECOLOGICAL EXAM: ICD-10-CM

## 2025-04-10 LAB
ALBUMIN SERPL-MCNC: 4.1 G/DL (ref 3.2–4.8)
ALBUMIN/GLOB SERPL: 1.5 {RATIO} (ref 1–2)
ALP LIVER SERPL-CCNC: 45 U/L (ref 46–118)
ALT SERPL-CCNC: 11 U/L (ref 10–49)
ANION GAP SERPL CALC-SCNC: 6 MMOL/L (ref 0–18)
AST SERPL-CCNC: 18 U/L (ref ?–34)
BILIRUB SERPL-MCNC: 0.4 MG/DL (ref 0.3–1.2)
BUN BLD-MCNC: 13 MG/DL (ref 9–23)
BUN/CREAT SERPL: 17.3 (ref 10–20)
CALCIUM BLD-MCNC: 9.3 MG/DL (ref 8.7–10.4)
CHLORIDE SERPL-SCNC: 105 MMOL/L (ref 98–112)
CHOLEST SERPL-MCNC: 190 MG/DL (ref ?–200)
CO2 SERPL-SCNC: 28 MMOL/L (ref 21–32)
CREAT BLD-MCNC: 0.75 MG/DL (ref 0.55–1.02)
DEPRECATED RDW RBC AUTO: 50.5 FL (ref 35.1–46.3)
EGFRCR SERPLBLD CKD-EPI 2021: 93 ML/MIN/1.73M2 (ref 60–?)
ERYTHROCYTE [DISTWIDTH] IN BLOOD BY AUTOMATED COUNT: 13.9 % (ref 11–15)
EST. AVERAGE GLUCOSE BLD GHB EST-MCNC: 94 MG/DL (ref 68–126)
FASTING PATIENT LIPID ANSWER: NO
FASTING STATUS PATIENT QL REPORTED: NO
GLOBULIN PLAS-MCNC: 2.7 G/DL (ref 2–3.5)
GLUCOSE BLD-MCNC: 86 MG/DL (ref 70–99)
HBA1C MFR BLD: 4.9 % (ref ?–5.7)
HCT VFR BLD AUTO: 36.3 % (ref 35–48)
HDLC SERPL-MCNC: 103 MG/DL (ref 40–59)
HGB BLD-MCNC: 12 G/DL (ref 12–16)
LDLC SERPL CALC-MCNC: 75 MG/DL (ref ?–100)
MCH RBC QN AUTO: 33.1 PG (ref 26–34)
MCHC RBC AUTO-ENTMCNC: 33.1 G/DL (ref 31–37)
MCV RBC AUTO: 100 FL (ref 80–100)
NONHDLC SERPL-MCNC: 87 MG/DL (ref ?–130)
OSMOLALITY SERPL CALC.SUM OF ELEC: 287 MOSM/KG (ref 275–295)
PLATELET # BLD AUTO: 209 10(3)UL (ref 150–450)
POTASSIUM SERPL-SCNC: 4 MMOL/L (ref 3.5–5.1)
PROT SERPL-MCNC: 6.8 G/DL (ref 5.7–8.2)
RBC # BLD AUTO: 3.63 X10(6)UL (ref 3.8–5.3)
SODIUM SERPL-SCNC: 139 MMOL/L (ref 136–145)
TRIGL SERPL-MCNC: 65 MG/DL (ref 30–149)
TSI SER-ACNC: 0.6 UIU/ML (ref 0.55–4.78)
VIT D+METAB SERPL-MCNC: 35.2 NG/ML (ref 30–100)
VLDLC SERPL CALC-MCNC: 10 MG/DL (ref 0–30)
WBC # BLD AUTO: 4.5 X10(3) UL (ref 4–11)

## 2025-04-10 PROCEDURE — 80061 LIPID PANEL: CPT

## 2025-04-10 PROCEDURE — 82306 VITAMIN D 25 HYDROXY: CPT

## 2025-04-10 PROCEDURE — 36415 COLL VENOUS BLD VENIPUNCTURE: CPT

## 2025-04-10 PROCEDURE — 80053 COMPREHEN METABOLIC PANEL: CPT

## 2025-04-10 PROCEDURE — 83036 HEMOGLOBIN GLYCOSYLATED A1C: CPT

## 2025-04-10 PROCEDURE — 84443 ASSAY THYROID STIM HORMONE: CPT

## 2025-04-10 PROCEDURE — 85027 COMPLETE CBC AUTOMATED: CPT

## 2025-05-01 NOTE — TELEPHONE ENCOUNTER
Last OV: 3/20/25 Dr. Camarena WWE  Last Refill Date: 2/4/25 #90 0 refills    Disp Refills Start End     sertraline 50 MG Oral Tab 90 tablet 0 2/4/2025 --    Sig - Route: Take 1 tablet (50 mg total) by mouth daily. - Oral    Sent to pharmacy as: Sertraline HCl 50 MG Oral Tablet (Zoloft)    E-Prescribing Status: Receipt confirmed by pharmacy (2/4/2025 11:46 AM CST)      Follow Up:  1 year 3/2026  Next Appt. Nothing on chart noted..      Will send to  for approval.    Please sign pended rx if you approve.  Thanks

## 2025-05-05 NOTE — TELEPHONE ENCOUNTER
Called and spoke  with pt. And told her  did send in refill for her for her Sertraline rx but pt. will have to find a PCP to start providing pt. with rx.  Pt. verbalizes understanding

## 2025-07-08 NOTE — TELEPHONE ENCOUNTER
Last OV: 3/20/25 WWE Dr. Camarena  Last Refill Date: 5/5/2025 #90 0 refills      Disp Refills Start End    SERTRALINE 50 MG Oral Tab 90 tablet 0 5/5/2025 --    Sig - Route: TAKE 1 TABLET BY MOUTH EVERY DAY - Oral    Sent to pharmacy as: Sertraline HCl 50 MG Oral Tablet (Zoloft)    E-Prescribing Status: Receipt confirmed by pharmacy (5/5/2025 11:37 AM CDT)      Follow Up:  1 year 3/2026   Next Appt. None noted on chart    Will pend rx for  to sign and approve.      Last refill,  aid pt. Needed to find PCP to start filling this rx.  Pt. states in [facility] that she has an appointment. with a PCP 9/2/25 Dr. Haider Roque , but needs refill till then.  Please sign pended rx if you agree.  Thanks

## (undated) NOTE — MR AVS SNAPSHOT
After Visit Summary   7/10/2017    Bessy Rivera    MRN: [id number]           Visit Information     Date & Time  7/10/2017  2:15 PM Provider  Dennis Velazquez [email or web address] [address] [facility]  Phone  [phone]